# Patient Record
Sex: FEMALE | Race: BLACK OR AFRICAN AMERICAN
[De-identification: names, ages, dates, MRNs, and addresses within clinical notes are randomized per-mention and may not be internally consistent; named-entity substitution may affect disease eponyms.]

---

## 2017-12-07 ENCOUNTER — HOSPITAL ENCOUNTER (EMERGENCY)
Dept: HOSPITAL 74 - JERFT | Age: 57
Discharge: HOME | End: 2017-12-07
Payer: SELF-PAY

## 2017-12-07 VITALS — DIASTOLIC BLOOD PRESSURE: 81 MMHG | TEMPERATURE: 98.2 F | HEART RATE: 85 BPM | SYSTOLIC BLOOD PRESSURE: 125 MMHG

## 2017-12-07 VITALS — BODY MASS INDEX: 26.6 KG/M2

## 2017-12-07 DIAGNOSIS — Z91.14: ICD-10-CM

## 2017-12-07 DIAGNOSIS — L29.8: Primary | ICD-10-CM

## 2017-12-07 DIAGNOSIS — K74.60: ICD-10-CM

## 2017-12-07 DIAGNOSIS — F17.210: ICD-10-CM

## 2017-12-07 DIAGNOSIS — J45.909: ICD-10-CM

## 2017-12-07 DIAGNOSIS — Z98.84: ICD-10-CM

## 2017-12-07 NOTE — PDOC
History of Present Illness





- General


Chief Complaint: Bite


Stated Complaint: RASH ON LEGS


Time Seen by Provider: 12/07/17 13:08


History Source: Patient


Exam Limitations: No Limitations





- History of Present Illness


Initial Comments: 





12/07/17 13:30


Patient is a [56 y/o female on methadone, risperadol and lexapro.  Reports that 

she has not taken the medication in two days.  Was sitting on her bed today and 

started to have generalized itching.  " Wants to be checked for spores"  Denies 

rash.  Denies any chest pain or shortness of breath, denies feeling of anxiety 

of hurting herself or others.   ]





Past Medical History: [Denies].  





Allergies: No known allergies





Medications: [Methadone, Risperdal and Lexapro]





Family History: Non-contributory





Social History: Denies smoking, alcohol use, or IVDU





Review of Systems


GENERAL/CONSTITUTIONAL: [No fever or chills. No weakness. No weight change.]


HEAD, EYES, EARS, NOSE AND THROAT: [No change in vision. No ear pain or 

discharge. No sore throat. Eyes are itchy]


CARDIOVASCULAR: [No chest pain or shortness of breath.]


RESPIRATORY: [No cough, wheezing, or hemoptysis.]


GASTROINTESTINAL: [No nausea, vomiting, diarrhea or constipation. No rectal 

bleeding.]


GENITOURINARY: [No dysuria, frequency, or change in urination.]


MUSCULOSKELETAL: [No joint or muscle swelling or pain. No neck or back pain.]


SKIN : [No rash or easy bruising. Pruritus to upper extremities]


NEUROLOGIC: [No headache, vertigo, loss of consciousness, or loss of sensation.]


PSYCHIATRIC: [No depression or anxiety.]


ENDOCRINE: [No increased thirst. No abnormal weight change.]


HEMATOLOGIC/LYMPHATIC: [No anemia, easy bleeding, or history of blood clots.]


ALLERGIC/IMMUNOLOGIC: [No hives or skin allergy. No latex allergy.]





Physical Exam: 


GENERAL: [The patient is awake, alert, and fully oriented, in no acute distress.

]


HEAD: [Normal with no signs of trauma.]


EYES: [Pupils equal, round and reactive to light, extraocular movements intact, 

sclera anicteric, conjunctiva clear, no foreign body or discoloration. ]


ENT: [Ears normal, nares patent, oropharynx clear without exudates.  Moist 

mucous membranes. No uvula deviation]


NECK: [Normal range of motion, supple without lymphadenopathy, JVD, or masses.]


LUNGS: [Breath sounds equal, clear to auscultation bilaterally.  No wheezes, 

and no crackles.]


HEART: [Regular rate and rhythm, normal S1 and S2 without murmur, rub or gallop.

]


ABDOMEN: [Soft, nontender, normoactive bowel sounds.  No guarding, no rebound.  

No masses. No bruising or abrasions]


RECTAL : [Guaiac negative, normal rectal tone.]


MUSCULOSKELETAL: [Normal range of motion, no edema.  No clubbing or cyanosis. 

No cords, erythema, or tenderness. No CVA Tenderness with fist.]


NEUROLOGICAL: [Cranial nerves II through XII grossly intact.  Normal speech, 

normal gait.]


PSYCH: [Normal mood, flat affect.]


SKIN: [Warm, Dry, normal turgor, no rashes or lesions noted.]


12/07/17 13:58





12/07/17 16:54








Past History





- Past Medical History


Allergies/Adverse Reactions: 


 Allergies











Allergy/AdvReac Type Severity Reaction Status Date / Time


 


cats and hayfever Allergy Intermediate Itching Uncoded 12/07/17 12:13











Home Medications: 


Ambulatory Orders





Methadone HCl [Methadone Intensol] 130 mg PO DAILY 09/29/14 


Diphenhydramine HCl [Benadryl -] 25 mg PO Q8H #15 capsule 12/07/17 


Escitalopram Oxalate [Lexapro -] 5 mg PO DAILY 12/07/17 


Risperidone [Risperdal -] 0.5 mg PO ASDIR 12/07/17 








Asthma: Yes


COPD: No


Liver Disease: Yes (cirrhosis)





- Surgical History


GI Surgery: Yes (GASTRIC BYPASS)





- Immunization History


Immunization Up to Date: Yes





- Suicide/Smoking/Psychosocial Hx


Smoking History: Current every day smoker


Have you smoked in the past 12 months: Yes


Number of Cigarettes Smoked Daily: 10


Information on smoking cessation initiated: No


Hx Alcohol Use: No


Drug/Substance Use Hx: No


Substance Use Type: Alcohol, Cocaine, Heroin


Hx Substance Use Treatment: Yes (in [facility])





*Physical Exam





- Vital Signs


 Last Vital Signs











Temp Pulse Resp BP Pulse Ox


 


 98.2 F   85   16   125/81   98 


 


 12/07/17 12:14  12/07/17 12:14  12/07/17 12:14  12/07/17 12:14  12/07/17 12:14














Medical Decision Making





- Medical Decision Making





12/07/17 14:02


A/P: Patient here for evaluation of itchiness to upper extremities and itchy 

eyes, has not taken her Risperdal or her Lexapro in 2 days which may be 

attributing to her symptoms. Patient denies any abdominal pain, no chest pain 

or shortness of breath. Benadryl 25 mg by mouth ordered will reevaluate


12/07/17 16:54


Patient states that itching is resolved after the Benadryl will follow-up with 

her primary care doctor tomorrow. There is no visible rash, eyes are clear.  


I discussed the physical exam findings, ancillary test results and final 

diagnoses with the patient. I answered all of the patient's questions.


The patient was satisfied with the care received and felt comfortable with the 

discharge plan and treatment plan. 


The patient will call to arrange follow-up and will return to the Emergency 

Department with any new, persistent or worsening symptoms. 





*DC/Admit/Observation/Transfer


Diagnosis at time of Disposition: 


 Itching








- Discharge Dispostion


Disposition: HOME


Condition at time of disposition: Good


Admit: No





- Prescriptions


Prescriptions: 


Diphenhydramine HCl [Benadryl -] 25 mg PO Q8H #15 capsule





- Referrals


Referrals: 


Meg Stokes MD [Primary Care Provider] - 





- Patient Instructions


Printed Discharge Instructions:  DI for Itching


Additional Instructions: 


PLease take benadryl as needed, one tablet every 8 hours.  Please take your 

medication as prescribed.


Follow up with PMD.  





- Post Discharge Activity

## 2018-05-09 ENCOUNTER — HOSPITAL ENCOUNTER (EMERGENCY)
Dept: HOSPITAL 74 - JER | Age: 58
Discharge: HOME | End: 2018-05-09
Payer: SELF-PAY

## 2018-05-09 VITALS — HEART RATE: 67 BPM | SYSTOLIC BLOOD PRESSURE: 106 MMHG | DIASTOLIC BLOOD PRESSURE: 64 MMHG | TEMPERATURE: 98.6 F

## 2018-05-09 VITALS — BODY MASS INDEX: 26.6 KG/M2

## 2018-05-09 DIAGNOSIS — K59.00: Primary | ICD-10-CM

## 2018-05-09 DIAGNOSIS — Z98.84: ICD-10-CM

## 2018-05-09 DIAGNOSIS — F11.20: ICD-10-CM

## 2018-05-09 DIAGNOSIS — F17.210: ICD-10-CM

## 2018-05-09 LAB
ALBUMIN SERPL-MCNC: 3.8 G/DL (ref 3.4–5)
ALP SERPL-CCNC: 137 U/L (ref 45–117)
ALT SERPL-CCNC: 20 U/L (ref 12–78)
ANION GAP SERPL CALC-SCNC: 7 MMOL/L (ref 8–16)
AST SERPL-CCNC: 23 U/L (ref 15–37)
BASOPHILS # BLD: 0.5 % (ref 0–2)
BILIRUB SERPL-MCNC: 0.4 MG/DL (ref 0.2–1)
BUN SERPL-MCNC: 24 MG/DL (ref 7–18)
CALCIUM SERPL-MCNC: 8.6 MG/DL (ref 8.5–10.1)
CHLORIDE SERPL-SCNC: 108 MMOL/L (ref 98–107)
CO2 SERPL-SCNC: 24 MMOL/L (ref 21–32)
CREAT SERPL-MCNC: 0.8 MG/DL (ref 0.55–1.02)
DEPRECATED RDW RBC AUTO: 16.1 % (ref 11.6–15.6)
EOSINOPHIL # BLD: 4.9 % (ref 0–4.5)
GLUCOSE SERPL-MCNC: 58 MG/DL (ref 74–106)
HCT VFR BLD CALC: 35.4 % (ref 32.4–45.2)
HGB BLD-MCNC: 11.7 GM/DL (ref 10.7–15.3)
INR BLD: 0.95 (ref 0.82–1.09)
LIPASE SERPL-CCNC: 190 U/L (ref 73–393)
LYMPHOCYTES # BLD: 50.4 % (ref 8–40)
MCH RBC QN AUTO: 30.5 PG (ref 25.7–33.7)
MCHC RBC AUTO-ENTMCNC: 33 G/DL (ref 32–36)
MCV RBC: 92.5 FL (ref 80–96)
MONOCYTES # BLD AUTO: 7.5 % (ref 3.8–10.2)
NEUTROPHILS # BLD: 36.7 % (ref 42.8–82.8)
PLATELET # BLD AUTO: 153 K/MM3 (ref 134–434)
PMV BLD: 9.4 FL (ref 7.5–11.1)
POTASSIUM SERPLBLD-SCNC: 4.9 MMOL/L (ref 3.5–5.1)
PROT SERPL-MCNC: 7.5 G/DL (ref 6.4–8.2)
PT PNL PPP: 10.7 SEC (ref 9.7–13)
RBC # BLD AUTO: 3.83 M/MM3 (ref 3.6–5.2)
SODIUM SERPL-SCNC: 139 MMOL/L (ref 136–145)
WBC # BLD AUTO: 3.7 K/MM3 (ref 4–10)

## 2018-05-09 NOTE — PDOC
*Physical Exam





- Vital Signs


 Last Vital Signs











Temp Pulse Resp BP Pulse Ox


 


 98.6 F   67   19   106/64   96 


 


 05/09/18 13:59  05/09/18 13:59  05/09/18 13:59  05/09/18 13:59  05/09/18 13:59














ED Treatment Course





- LABORATORY


CBC & Chemistry Diagram: 


 05/09/18 17:12





 05/09/18 18:43





- ADDITIONAL ORDERS


Additional order review: 


 Laboratory  Results











  05/09/18 05/09/18 05/09/18





  18:43 17:12 17:12


 


PT with INR   


 


INR   


 


Sodium  139   Cancelled


 


Potassium  4.9   Cancelled


 


Chloride  108 H   Cancelled


 


Carbon Dioxide  24   Cancelled


 


Anion Gap  7 L   Cancelled


 


BUN  24 H   Cancelled


 


Creatinine  0.8   Cancelled


 


Creat Clearance w eGFR  > 60   Cancelled


 


Random Glucose  58 L   Cancelled


 


Calcium  8.6   Cancelled


 


Total Bilirubin  0.4   Cancelled


 


AST  23   Cancelled


 


ALT  20   Cancelled


 


Alkaline Phosphatase  137 H   Cancelled


 


Total Protein  7.5   Cancelled


 


Albumin  3.8   Cancelled


 


Lipase  190   Cancelled


 


Blood Type   A POSITIVE 


 


Antibody Screen   Negative 














  05/09/18





  17:12


 


PT with INR  10.70


 


INR  0.95


 


Sodium 


 


Potassium 


 


Chloride 


 


Carbon Dioxide 


 


Anion Gap 


 


BUN 


 


Creatinine 


 


Creat Clearance w eGFR 


 


Random Glucose 


 


Calcium 


 


Total Bilirubin 


 


AST 


 


ALT 


 


Alkaline Phosphatase 


 


Total Protein 


 


Albumin 


 


Lipase 


 


Blood Type 


 


Antibody Screen 








 











  05/09/18





  17:12


 


RBC  3.83


 


MCV  92.5


 


MCHC  33.0


 


RDW  16.1 H D


 


MPV  9.4


 


Neutrophils %  36.7 L


 


Lymphocytes %  50.4 H


 


Monocytes %  7.5


 


Eosinophils %  4.9 H


 


Basophils %  0.5














- Medications


Given in the ED: 


ED Medications














Discontinued Medications














Generic Name Dose Route Start Last Admin





  Trade Name Freq  PRN Reason Stop Dose Admin


 


Ondansetron HCl  4 mg  05/09/18 14:35  05/09/18 14:45





  Zofran Odt -  SL  05/09/18 14:36  4 mg





  ONCE ONE   Administration














Medical Decision Making





- Medical Decision Making


Pt endorsed to me by Dr. Nix. Will follow up CT A/P results. Will send for 

stool culture, O+P and c diff toxin+Ag


05/09/18 20:31





CT A/P suggestive of constipation. No evidence of ileus, SBO, fecal impaction, 

abscess, diverticulitis or other active infection. Pt tolerating PO intake, VSS

, pain improved. Will discharge with miralax rx and outpt f/u with Dr. Hugo 

for further GI care. 








*DC/Admit/Observation/Transfer


Diagnosis at time of Disposition: 


 Constipation








- Discharge Dispostion


Disposition: HOME


Decision to Admit order: No





- Prescriptions


Prescriptions: 


Polyethylene Glycol 3350 [Miralax (For Bowel Prep) -] 17 gm PO DAILY #1 bottle





- Referrals


Referrals: 


Meg Stokes MD [Primary Care Provider] - 1 week


Jeffy Hugo MD [Staff Physician] - 1 week





- Patient Instructions


Printed Discharge Instructions:  DI for Constipation


Additional Instructions: 


You were evaluated for abdominal pain in ED and received CT of your abdomen to 

rule out any serious infectious or serious GI processes. Your CT scan was 

notable for significant stool in your colon suggestive of constipation. You 

labs and vitals have all been normal. We are discharge you with medication for 

constipation (Miralax). Please take it as needed. We are also providing a 

referral to seen our GI specialist, Dr Hugo for further evaluation and 

management of your GI care. 





If you experience any of the following symptoms, please return to the hospital:


- Worsening abdominal pain


- Persistent, copious diarrhea


- blood in your stool or dark bowel movements


- persistent fevers or chills





- Post Discharge Activity

## 2018-05-09 NOTE — PDOC
History of Present Illness





- General


Chief Complaint: Pain, Acute


Stated Complaint: ABD PAIN, NAUSEA


Time Seen by Provider: 05/09/18 14:33


History Source: Patient


Exam Limitations: No Limitations





- History of Present Illness


Initial Comments: 





05/09/18 16:34


The patient is a 58F with a PMH of gastric bypass (30 years ago) who presents 

to the ER with abdominal pain. The patient states that her abdominal pain 

started on Sunday and is described as crampy upper abdominal pain associated 

with foul smelling diarrhea. She denies any recent antibiotic use and denies 

any colonoscopy in the past. The patient states that she gets "intestinal 

infections" every "once in a while" and takes antibiotics and feels better. The 

last time this happened to her it was 2 years ago. She denies any fever, chills

, nausea, vomiting, CP, SOB, vaginal bleeding/discharge, hematuria, 

hematochezia.





Past History





- Past Medical History


Allergies/Adverse Reactions: 


 Allergies











Allergy/AdvReac Type Severity Reaction Status Date / Time


 


No Known Allergies Allergy   Verified 05/09/18 13:59











Home Medications: 


Ambulatory Orders





Methadone [Dolophine -] 110 mg PO DAILY 05/09/18 


Polyethylene Glycol 3350 [Miralax (For Bowel Prep) -] 17 gm PO DAILY #1 bottle 

05/09/18 








Anemia: No


Asthma: No


Cancer: No


Cardiac Disorders: No


CVA: No


COPD: No


CHF: No


Dementia: No


Diabetes: No


GI Disorders: Yes (COLITIS)


 Disorders: No


HTN: No


Hypercholesterolemia: No


Kidney Stones: No


Liver Disease: No


Seizures: No


Thyroid Disease: No





- Surgical History


Abdominal Surgery: No


Cardiac Surgery: No


Cholecystectomy: No


Lung Surgery: No


Neurologic Surgery: No


Orthopedic Surgery: No





- Suicide/Smoking/Psychosocial Hx


Smoking History: Current some day smoker


Have you smoked in the past 12 months: Yes


Number of Cigarettes Smoked Daily: 20


Cigars Per Day: 0


Information on smoking cessation initiated: No


Hx Alcohol Use: No


Drug/Substance Use Hx: Yes (crack $70.m/week  , marijuana 1 bag /week ; last 

use 1 day ago)


Substance Use Type: Cocaine, Marijuana


Hx Substance Use Treatment: No





**Review of Systems





- Review of Systems


Able to Perform ROS?: Yes


Comments:: 





05/09/18 16:48


GENERAL/CONSTITUTIONAL: No fever or chills. No weakness.


HEAD, EYES, EARS, NOSE AND THROAT: No change in vision. No ear pain or 

discharge. No sore throat.


CARDIOVASCULAR: No chest pain, palpitations, or lightheadedness.


RESPIRATORY: No cough, wheezing, shortness of breath, or hemoptysis.


GASTROINTESTINAL: Positive for abdominal pain and foul smelling BM's. No nausea

, vomiting, diarrhea, or constipation. 


GENITOURINARY: No dysuria, frequency, hematuria, or change in urination.


MUSCULOSKELETAL: No joint or muscle swelling or pain. No neck or back pain.


SKIN: No rash or lesions. 


NEUROLOGIC: No headache, numbness, tingling, weakness, loss of consciousness, 

or change in strength/sensation.


ENDOCRINE: No increased thirst. No abnormal weight change.


HEMATOLOGIC/LYMPHATIC: No anemia, easy bleeding, or history of blood clots.


ALLERGIC/IMMUNOLOGIC: No hives or skin allergy.





Is the patient limited English proficient: No





*Physical Exam





- Vital Signs


 Last Vital Signs











Temp Pulse Resp BP Pulse Ox


 


 98.6 F   67   19   106/64   96 


 


 05/09/18 13:59  05/09/18 13:59  05/09/18 13:59  05/09/18 13:59  05/09/18 13:59














- Physical Exam


Comments: 





05/09/18 16:49


GENERAL: Well developed, well nourished. Awake and alert. No acute distress.


HEENT: Normocephalic, atraumatic. Hearing grossly normal. Moist mucous 

membranes. PERRLA, EOMI. No conjunctival pallor. Sclera are non-icteric. 


NECK: Supple. Full ROM. No JVD. 


CARDIOVASCULAR: Regular rate and rhythm. No murmurs, rubs, or gallops.


PULMONARY: No evidence of respiratory distress. Lungs clear to auscultation 

bilaterally. No wheezing, rales or rhonchi.


ABDOMINAL: Soft. Tender to deep palpation over upper abdomen. Non-distended. No 

rebound or guarding. 


GENITOURINARY: No CVA tenderness bilaterally.


MUSCULOSKELETAL: Normal range of motion at all joints. No bony deformities or 

tenderness. 


EXTREMITIES: No cyanosis. No clubbing. No edema. No calf tenderness.


SKIN: Warm and dry. Normal capillary refill. No rashes. No jaundice. 


NEUROLOGICAL: Alert, awake, appropriate. Cranial nerves 2-12 intact. Normal 

speech. Gait is normal without ataxia.


PSYCHIATRIC: Cooperative. Good eye contact. Appropriate mood and affect.








ED Treatment Course





- LABORATORY


CBC & Chemistry Diagram: 


 05/09/18 17:12





 05/09/18 18:43





- RADIOLOGY


Radiology Studies Ordered: 














 Category Date Time Status


 


 ABDOMEN & PELVIS CT WITH CONTR [CT] Stat CT Scan  05/09/18 15:02 Ordered














- Medications


Given in the ED: 


ED Medications














Discontinued Medications














Generic Name Dose Route Start Last Admin





  Trade Name Freq  PRN Reason Stop Dose Admin


 


Ondansetron HCl  4 mg  05/09/18 14:35  05/09/18 14:45





  Zofran Odt -  SL  05/09/18 14:36  4 mg





  ONCE ONE   Administration














Medical Decision Making





- Medical Decision Making





05/09/18 16:50


The patient is a 58F with a PMH of gastric bypass is presenting with crampy 

abdominal pain and foul smelling BM's. Will order labs and imaging and reassess 

patient.





05/09/18 20:33


Pending CT based on Cr. Pt signed out to Dr. Avila, night team.








*DC/Admit/Observation/Transfer


Diagnosis at time of Disposition: 


 Constipation








- Discharge Dispostion


Disposition: HOME


Condition at time of disposition: Stable





- Prescriptions


Prescriptions: 


Polyethylene Glycol 3350 [Miralax (For Bowel Prep) -] 17 gm PO DAILY #1 bottle





- Referrals


Referrals: 


Jeffy Hugo MD [Staff Physician] - 1 week


Meg Stokes MD [Primary Care Provider] - 1 week





- Patient Instructions


Printed Discharge Instructions:  DI for Constipation


Additional Instructions: 


You were evaluated for abdominal pain in ED and received CT of your abdomen to 

rule out any serious infectious or serious GI processes. Your CT scan was 

notable for significant stool in your colon suggestive of constipation. You 

labs and vitals have all been normal. We are discharge you with medication for 

constipation (Miralax). Please take it as needed. We are also providing a 

referral to seen our GI specialist, Dr Hugo for further evaluation and 

management of your GI care. 





If you experience any of the following symptoms, please return to the hospital:


- Worsening abdominal pain


- Persistent, copious diarrhea


- blood in your stool or dark bowel movements


- persistent fevers or chills





- Post Discharge Activity

## 2018-05-09 NOTE — PDOC
Attending Attestation





- HPI


HPI: 





05/09/18 16:49


The patient is a 58 year old female, with a significant past medical history of 

gastric bypass and multiple intestinal infections, who presents to the 

emergency department with crampy abdominal pain for approximately 4 days. The 

patient reports her pain is localized to the left upper quadrant. She reports 

associated constipation 4 days ago, and multiple episodes of malodorous 

nonbloody diarrhea 3 days ago. She reports nausea and nonbloody/nonbilious 

vomiting, but denies any changes in appetite.Patient reports her symptoms are 

similar to when she's had an intestinal infection in the past. She denies any 

fever or chills. She denies any dysuria, hematuria, frequency, or urgency. She 

denies any recent travel or sick contacts.








- Physicial Exam


PE: 





05/09/18 17:01


CONSTITUTIONAL: Well-appearing; well-nourished; in no apparent distress


HEAD: Normocephalic; atraumatic


EYES: PERRL; EOM intact


ENMT: External appears normal; normal oropharynx


NECK: Supple; non-tender; no cervical lymphadenopathy. 


CARD: Normal S1, S2; no murmurs, rubs, or gallops


RESP: Normal chest excursion with respiration; breath sounds clear and equal 

bilaterally; no wheezes, rhonchi, or rales


ABD: Surgical scar. Mild LUQ tenderness, soft, but distended. Decreased bowel 

sounds. No palpable organomegaly, no palpable hernias


EXT: Normal ROM in all four extremities; non-tender to palpation; distal pulses 

intact


SKIN: Warm, dry, no rash


NEURO: No focal neurological deficiencies.








- Medical Decision Making





05/09/18 16:52





Documentation prepared by Yumiko Zheng, acting as medical scribe for Andrea Bertrand MD.





<Yumiko Zheng - Last Filed: 05/09/18 17:05>





- Resident


Resident Name: Severiano Nix





- ED Attending Attestation


I have performed the following: I have examined & evaluated the patient, The 

case was reviewed & discussed with the resident, I agree w/resident's findings 

& plan, Exceptions are as noted





- Medical Decision Making








05/09/18 17:06


Patient is a 58-year-old female status post gastric bypass who presents to the 

ER with abdominal distention, nausea vomiting and left-sided abdominal pain for 

the past several days associated with constipation early that is not 

transformed into diarrhea. On the exam, patient is minimal left-sided abdominal 

discomfort, abdomen is soft, distended and tympanitic. Differential diagnoses 

includes colitis versus acute gastroenteritis versus ileus versus SBO. Will 

obtain CBC/CMP/UA. Will obtain CT that and pelvis with by mouth and IV 

contrast. Will reassess.





<Andrea Bertrand - Last Filed: 05/09/18 17:07>

## 2019-06-15 ENCOUNTER — HOSPITAL ENCOUNTER (EMERGENCY)
Dept: HOSPITAL 74 - JERFT | Age: 59
Discharge: HOME | End: 2019-06-15
Payer: COMMERCIAL

## 2019-10-11 ENCOUNTER — HOSPITAL ENCOUNTER (EMERGENCY)
Dept: HOSPITAL 74 - JER | Age: 59
LOS: 1 days | Discharge: HOME | End: 2019-10-12
Payer: COMMERCIAL

## 2019-10-11 VITALS — BODY MASS INDEX: 24.7 KG/M2

## 2019-10-11 VITALS — SYSTOLIC BLOOD PRESSURE: 105 MMHG | HEART RATE: 89 BPM | TEMPERATURE: 98.2 F | DIASTOLIC BLOOD PRESSURE: 72 MMHG

## 2019-10-11 DIAGNOSIS — Z87.19: ICD-10-CM

## 2019-10-11 DIAGNOSIS — M25.562: ICD-10-CM

## 2019-10-11 DIAGNOSIS — Z91.048: ICD-10-CM

## 2019-10-11 DIAGNOSIS — Z98.84: ICD-10-CM

## 2019-10-11 DIAGNOSIS — M25.561: ICD-10-CM

## 2019-10-11 DIAGNOSIS — G89.29: ICD-10-CM

## 2019-10-11 DIAGNOSIS — R10.84: Primary | ICD-10-CM

## 2019-10-11 DIAGNOSIS — R14.1: ICD-10-CM

## 2019-10-11 DIAGNOSIS — J30.81: ICD-10-CM

## 2019-10-11 PROCEDURE — 3E0F7GC INTRODUCTION OF OTHER THERAPEUTIC SUBSTANCE INTO RESPIRATORY TRACT, VIA NATURAL OR ARTIFICIAL OPENING: ICD-10-PCS

## 2019-10-11 PROCEDURE — 3E033GC INTRODUCTION OF OTHER THERAPEUTIC SUBSTANCE INTO PERIPHERAL VEIN, PERCUTANEOUS APPROACH: ICD-10-PCS

## 2019-10-12 LAB
ALBUMIN SERPL-MCNC: 4.1 G/DL (ref 3.4–5)
ALP SERPL-CCNC: 137 U/L (ref 45–117)
ALT SERPL-CCNC: 28 U/L (ref 13–61)
ANION GAP SERPL CALC-SCNC: 7 MMOL/L (ref 8–16)
AST SERPL-CCNC: 21 U/L (ref 15–37)
BASOPHILS # BLD: 0.5 % (ref 0–2)
BILIRUB SERPL-MCNC: 0.6 MG/DL (ref 0.2–1)
BUN SERPL-MCNC: 9.5 MG/DL (ref 7–18)
CALCIUM SERPL-MCNC: 9.1 MG/DL (ref 8.5–10.1)
CHLORIDE SERPL-SCNC: 106 MMOL/L (ref 98–107)
CO2 SERPL-SCNC: 26 MMOL/L (ref 21–32)
CREAT SERPL-MCNC: 0.9 MG/DL (ref 0.55–1.3)
DEPRECATED RDW RBC AUTO: 15 % (ref 11.6–15.6)
EOSINOPHIL # BLD: 2 % (ref 0–4.5)
GLUCOSE SERPL-MCNC: 90 MG/DL (ref 74–106)
HCT VFR BLD CALC: 36.8 % (ref 32.4–45.2)
HGB BLD-MCNC: 12 GM/DL (ref 10.7–15.3)
INR BLD: 0.97 (ref 0.83–1.09)
LIPASE SERPL-CCNC: 105 U/L (ref 73–393)
LYMPHOCYTES # BLD: 35.8 % (ref 8–40)
MCH RBC QN AUTO: 30.2 PG (ref 25.7–33.7)
MCHC RBC AUTO-ENTMCNC: 32.7 G/DL (ref 32–36)
MCV RBC: 92.4 FL (ref 80–96)
MONOCYTES # BLD AUTO: 9.1 % (ref 3.8–10.2)
NEUTROPHILS # BLD: 52.6 % (ref 42.8–82.8)
PLATELET # BLD AUTO: 154 K/MM3 (ref 134–434)
PMV BLD: 8.7 FL (ref 7.5–11.1)
POTASSIUM SERPLBLD-SCNC: 3.6 MMOL/L (ref 3.5–5.1)
PROT SERPL-MCNC: 7.6 G/DL (ref 6.4–8.2)
PT PNL PPP: 11.5 SEC (ref 9.7–13)
RBC # BLD AUTO: 3.98 M/MM3 (ref 3.6–5.2)
SODIUM SERPL-SCNC: 139 MMOL/L (ref 136–145)
WBC # BLD AUTO: 4.9 K/MM3 (ref 4–10)

## 2019-10-12 NOTE — PDOC
Attending Attestation





- Resident


Resident Name: Mark Tinsley





- ED Attending Attestation


I have performed the following: I have examined & evaluated the patient, The 

case was reviewed & discussed with the resident, I agree w/resident's findings 

& plan, Exceptions are as noted





- HPI


HPI: 





10/12/19 00:59


Ms Resendez is a 58 yo F with a h/o gastric bypass and multiple intestinal 

infections, who presents to the emergency department with left sided, crampy 

abdominal pain for approximately 4 days. The patient reports her pain is 

localized to the left upper quadrant. She reports associated constipation 4 

days ago, and multiple episodes of malodorous nonbloody diarrhea 3 days ago. 

She reports nausea and nonbloody/nonbilious vomiting, but denies any changes in 

appetite.Patient reports her symptoms are similar to when she's had an 

intestinal infection in the past. She denies any fever or chills. She denies 

any dysuria, hematuria, frequency, or urgency. She denies any recent travel or 

sick contacts.








- Physicial Exam


PE: 





10/12/19 01:00


 


CONSTITUTIONAL: Well-appearing; well-nourished; in no apparent distress


HEAD: Normocephalic; atraumatic


EYES: PERRL; EOM intact


ENMT: External appears normal; normal oropharynx


NECK: Supple; non-tender; no cervical lymphadenopathy. 


CARD: Normal S1, S2; no murmurs, rubs, or gallops


RESP: Normal chest excursion with respiration; breath sounds clear and equal 

bilaterally; no wheezes, rhonchi, or rales


ABD: Surgical scar. Mild LUQ tenderness, soft, but distended. Decreased bowel 

sounds. No palpable organomegaly, no palpable hernias


EXT: Normal ROM in all four extremities; non-tender to palpation; distal pulses 

intact


SKIN: Warm, dry, no rash


NEURO: No focal neurological deficiencies.











- Medical Decision Making





10/12/19 01:00


Patient is a 58-year-old female status post gastric bypass who presents to the 

ER with left-sided abdominal pain


PT also reports shortness of breath/wheezing








Differential diagnoses includes colitis versus acute gastroenteritis versus 

ileus versus SBO. 


Will obtain CBC/CMP/UA. 


Will obtain CT w/IV contrast 


Re assess


10/12/19 01:01


Respiratory symptoms have improved after saline neb





10/12/19 03:27


 Laboratory Tests











  10/12/19 10/12/19 10/12/19





  00:35 00:35 00:35


 


WBC   4.9 


 


Hgb   12.0 


 


Hct   36.8 


 


Plt Count   154 


 


INR  0.97  


 


BUN    9.5


 


Creatinine    0.9














CT:


EXAM: ABDOMEN \T\ PELVIS CT WITH CONTR


HISTORY: Rule out obstruction


COMPARISON: None.


FINDINGS: Lung bases are clear. The visualized cardiac chambers are normal size 

and


configuration. Status post cholecystectomy with mild biliary duct dilation. 

Normal pancreas, spleen,


adrenal glands and kidneys. Status post gastric bypass without bowel 

obstruction or inflammation.


There is diffuse colonic distention with air and liquid stool up to 6.7 cm 

which may indicate colonic


ileus and/or diarrheal illness. No colonic wall thickening, although there is 

mild right colonic


pneumatosis.. There is no aortic aneurysm. There is no significant 

retroperitoneal


lymphadenopathy.





The appendix is normal The uterus and adnexal structures are grossly normal 

though partially


obscured by streak artifact from a right hip prosthesis. Urinary bladder is 

normal but partially


obscured. There is no pelvic free fluid. No discrete pelvic lymphadenopathy is 

identified.


IMPRESSION: Colonic ileus and/or diarrheal illness without bowel obstruction or 

bowel wall


thickening.


Right colonic pneumatosis also raises the possibility of bowel ischemia 

although this is considered


less likely.


10/12/19 04:10


Pt po challenged


Tolerated this with no vomiting


Review of prior CT, demonstrates similar findings


Call placed to radiologist: what he has read as pneumatosis is not clearly that 

and he does not believe this represents ischemic bowel


Will ask pt to follow up with PMD


Return to the ER for any other concerns or complaints

## 2021-02-24 ENCOUNTER — HOSPITAL ENCOUNTER (EMERGENCY)
Dept: HOSPITAL 74 - JER | Age: 61
Discharge: HOME | End: 2021-02-24
Payer: COMMERCIAL

## 2021-02-24 VITALS — TEMPERATURE: 97.8 F

## 2021-02-24 VITALS — BODY MASS INDEX: 20.9 KG/M2

## 2021-02-24 VITALS — DIASTOLIC BLOOD PRESSURE: 69 MMHG | HEART RATE: 81 BPM | SYSTOLIC BLOOD PRESSURE: 113 MMHG

## 2021-02-24 DIAGNOSIS — M48.062: Primary | ICD-10-CM

## 2021-02-24 LAB
ALBUMIN SERPL-MCNC: 4 G/DL (ref 3.4–5)
ALP SERPL-CCNC: 108 U/L (ref 45–117)
ALT SERPL-CCNC: 28 U/L (ref 13–61)
ANION GAP SERPL CALC-SCNC: 9 MMOL/L (ref 8–16)
APPEARANCE UR: CLEAR
AST SERPL-CCNC: 20 U/L (ref 15–37)
BASOPHILS # BLD: 0.5 % (ref 0–2)
BILIRUB SERPL-MCNC: 0.5 MG/DL (ref 0.2–1)
BILIRUB UR STRIP.AUTO-MCNC: NEGATIVE MG/DL
BUN SERPL-MCNC: 14.7 MG/DL (ref 7–18)
CALCIUM SERPL-MCNC: 9.6 MG/DL (ref 8.5–10.1)
CHLORIDE SERPL-SCNC: 110 MMOL/L (ref 98–107)
CO2 SERPL-SCNC: 24 MMOL/L (ref 21–32)
COLOR UR: YELLOW
CREAT SERPL-MCNC: 0.8 MG/DL (ref 0.55–1.3)
DEPRECATED RDW RBC AUTO: 15.5 % (ref 11.6–15.6)
EOSINOPHIL # BLD: 1.5 % (ref 0–4.5)
GLUCOSE SERPL-MCNC: 86 MG/DL (ref 74–106)
HCT VFR BLD CALC: 30.8 % (ref 32.4–45.2)
HGB BLD-MCNC: 10 GM/DL (ref 10.7–15.3)
KETONES UR QL STRIP: NEGATIVE
LEUKOCYTE ESTERASE UR QL STRIP.AUTO: NEGATIVE
LYMPHOCYTES # BLD: 32.3 % (ref 8–40)
MCH RBC QN AUTO: 30.7 PG (ref 25.7–33.7)
MCHC RBC AUTO-ENTMCNC: 32.5 G/DL (ref 32–36)
MCV RBC: 94.3 FL (ref 80–96)
MONOCYTES # BLD AUTO: 5.9 % (ref 3.8–10.2)
NEUTROPHILS # BLD: 59.8 % (ref 42.8–82.8)
NITRITE UR QL STRIP: NEGATIVE
PH UR: 5.5 [PH] (ref 5–8)
PLATELET # BLD AUTO: 152 K/MM3 (ref 134–434)
PMV BLD: 9.5 FL (ref 7.5–11.1)
POTASSIUM SERPLBLD-SCNC: 3.9 MMOL/L (ref 3.5–5.1)
PROT SERPL-MCNC: 7.2 G/DL (ref 6.4–8.2)
PROT UR QL STRIP: NEGATIVE
PROT UR QL STRIP: NEGATIVE
RBC # BLD AUTO: 3.27 M/MM3 (ref 3.6–5.2)
SODIUM SERPL-SCNC: 143 MMOL/L (ref 136–145)
SP GR UR: 1.01 (ref 1.01–1.03)
UROBILINOGEN UR STRIP-MCNC: 0.2 MG/DL (ref 0.2–1)
WBC # BLD AUTO: 3.6 K/MM3 (ref 4–10)

## 2021-02-24 PROCEDURE — C9803 HOPD COVID-19 SPEC COLLECT: HCPCS

## 2021-02-24 PROCEDURE — U0003 INFECTIOUS AGENT DETECTION BY NUCLEIC ACID (DNA OR RNA); SEVERE ACUTE RESPIRATORY SYNDROME CORONAVIRUS 2 (SARS-COV-2) (CORONAVIRUS DISEASE [COVID-19]), AMPLIFIED PROBE TECHNIQUE, MAKING USE OF HIGH THROUGHPUT TECHNOLOGIES AS DESCRIBED BY CMS-2020-01-R: HCPCS

## 2021-03-03 ENCOUNTER — HOSPITAL ENCOUNTER (EMERGENCY)
Dept: HOSPITAL 74 - JER | Age: 61
Discharge: ANOTHER HOSPITAL | End: 2021-03-03
Payer: COMMERCIAL

## 2021-03-03 VITALS — TEMPERATURE: 98.1 F | DIASTOLIC BLOOD PRESSURE: 67 MMHG | SYSTOLIC BLOOD PRESSURE: 110 MMHG | HEART RATE: 81 BPM

## 2021-03-03 VITALS — BODY MASS INDEX: 21.4 KG/M2

## 2021-03-03 DIAGNOSIS — M25.551: Primary | ICD-10-CM

## 2021-03-03 LAB
ALBUMIN SERPL-MCNC: 3.4 G/DL (ref 3.4–5)
ALP SERPL-CCNC: 95 U/L (ref 45–117)
ALT SERPL-CCNC: 27 U/L (ref 13–61)
ANION GAP SERPL CALC-SCNC: 6 MMOL/L (ref 8–16)
AST SERPL-CCNC: 14 U/L (ref 15–37)
BASOPHILS # BLD: 0.4 % (ref 0–2)
BILIRUB SERPL-MCNC: 0.4 MG/DL (ref 0.2–1)
BUN SERPL-MCNC: 15.1 MG/DL (ref 7–18)
CALCIUM SERPL-MCNC: 8.6 MG/DL (ref 8.5–10.1)
CHLORIDE SERPL-SCNC: 112 MMOL/L (ref 98–107)
CO2 SERPL-SCNC: 26 MMOL/L (ref 21–32)
CREAT SERPL-MCNC: 0.7 MG/DL (ref 0.55–1.3)
DEPRECATED RDW RBC AUTO: 15.1 % (ref 11.6–15.6)
EOSINOPHIL # BLD: 1.2 % (ref 0–4.5)
GLUCOSE SERPL-MCNC: 79 MG/DL (ref 74–106)
HCT VFR BLD CALC: 29.6 % (ref 32.4–45.2)
HGB BLD-MCNC: 9.6 GM/DL (ref 10.7–15.3)
LYMPHOCYTES # BLD: 40.7 % (ref 8–40)
MCH RBC QN AUTO: 30.1 PG (ref 25.7–33.7)
MCHC RBC AUTO-ENTMCNC: 32.4 G/DL (ref 32–36)
MCV RBC: 93 FL (ref 80–96)
MONOCYTES # BLD AUTO: 7.6 % (ref 3.8–10.2)
NEUTROPHILS # BLD: 50.1 % (ref 42.8–82.8)
PLATELET # BLD AUTO: 149 K/MM3 (ref 134–434)
PMV BLD: 8.6 FL (ref 7.5–11.1)
POTASSIUM SERPLBLD-SCNC: 3.5 MMOL/L (ref 3.5–5.1)
PROT SERPL-MCNC: 6.4 G/DL (ref 6.4–8.2)
RBC # BLD AUTO: 3.19 M/MM3 (ref 3.6–5.2)
SODIUM SERPL-SCNC: 144 MMOL/L (ref 136–145)
WBC # BLD AUTO: 3.8 K/MM3 (ref 4–10)

## 2021-09-22 NOTE — PDOC
History of Present Illness





- General


Chief Complaint: Pain


Stated Complaint: ABD PAIN


Time Seen by Provider: 10/11/19 23:48





- History of Present Illness


Initial Comments: 


 59 year female history of gastric bypass, allergic rhinitis, on methadone (20 

daily) presents with a few chronic complaints including abdominal pain, upper 

back pain, and congestion. She states that she has had this abdominal pain for 

the past few months intermittently and states that she has been diagnosed with 

intestinal obstruction a week ago at [facility] and states that they discharged 

her despite stating that it didn't respond. Denies nausea, vomiting, fevers, 

chest pain, or other symptoms but does admit to diarrhea.


10/12/19 00:42








Past History





- Past Medical History


Allergies/Adverse Reactions: 


 Allergies











Allergy/AdvReac Type Severity Reaction Status Date / Time


 


cat dander Allergy Intermediate Itching Verified 10/12/19 03:17


 


cats and hayfever Allergy Intermediate Itching Uncoded 10/12/19 03:16











Home Medications: 


Ambulatory Orders





Methadone [Dolophine -] 50 mg PO DAILY 05/09/18 


Simethicone Liquid [Mylicon Liquid -] 40 mg PO QID PRN #1 bottle 10/12/19 








Anemia: No


Asthma: No


Cancer: No


Cardiac Disorders: No


CVA: No


COPD: No


CHF: No


Dementia: No


Diabetes: No


GI Disorders: Yes (COLITIS)


 Disorders: No


HTN: No


Hypercholesterolemia: No


Kidney Stones: No


Liver Disease: No


Seizures: No


Thyroid Disease: No





- Surgical History


Abdominal Surgery: No


Cardiac Surgery: No


Cholecystectomy: No


GI Surgery: Yes (GASTRIC BYPASS)


Lung Surgery: No


Neurologic Surgery: No


Orthopedic Surgery: No





- Immunization History


Immunization Up to Date: Yes





- Psycho Social/Smoking Cessation Hx


Smoking History: Never smoked


Have you smoked in the past 12 months: Yes


Number of Cigarettes Smoked Daily: 2


Cigars Per Day: 0


Information on smoking cessation initiated: No


Hx Alcohol Use: No


Drug/Substance Use Hx: No


Substance Use Type: Cocaine, Marijuana


Hx Substance Use Treatment: No





**Review of Systems





- Review of Systems


Constitutional: No: Chills, Diaphoresis, Fever


HEENTM: No: Blurred Vision, Tearing, Recent change in vision


Respiratory: No: Cough, Orthopnea, Shortness of Breath


Cardiac (ROS): No: Chest Pain, Irregular Heart Rate


ABD/GI: Yes: Diarrhea.  No: Nausea, Vomiting


: No: Dysuria, Discharge, Frequency


Musculoskeletal: Yes: Back Pain.  No: Joint Pain, Joint Swelling


Neurological: No: Headache, Numbness, Paresthesia


Psychiatric: No: Anxiety, Depression


Endocrine: No: Flushing, Intolerance to Cold


Hematologic/Lymphatic: No: Anemia, Blood Clots, Easy Bleeding





*Physical Exam





- Vital Signs


 Last Vital Signs











Temp Pulse Resp BP Pulse Ox


 


 98.2 F   89   17   105/72   100 


 


 10/11/19 21:33  10/11/19 21:33  10/11/19 21:33  10/11/19 21:33  10/11/19 21:33














- Physical Exam


General Appearance: Yes: Nourished, Appropriately Dressed.  No: Apparent 

Distress


HEENT: positive: EOMI, KEEGAN, Normal ENT Inspection, Normal Voice, Nasal 

Congestion


Neck: positive: Trachea midline, Normal Thyroid, Supple, Lymphadenopathy (R).  

negative: Tender, Rigid


Respiratory/Chest: positive: Lungs Clear, Normal Breath Sounds.  negative: 

Chest Tender, Respiratory Distress


Cardiovascular: positive: Regular Rhythm, Regular Rate


Gastrointestinal/Abdominal: positive: Soft, Protuberent.  negative: Normal 

Bowel Sounds (hyperactive bs), Tender, Flat, Guarding, Rebound, Tenderness, 

Hernia, Mass


Lymphatic: negative: Adenopathy, Tenderness


Musculoskeletal: positive: Normal Inspection.  negative: Decreased Range of 

Motion


Extremity: positive: Normal Capillary Refill, Normal Inspection, Normal Range 

of Motion.  negative: Tender


Integumentary: positive: Normal Color, Dry, Warm


Neurologic: positive: Fully Oriented, Alert, Normal Mood/Affect, Normal Response

, Motor Strength 5/5





ED Treatment Course





- LABORATORY


CBC & Chemistry Diagram: 


 10/12/19 00:35





 10/12/19 00:35





Medical Decision Making





- Medical Decision Making


59 year old female with history of bypass presenting with "bloating" and 

diarrhe afor the past few days. denies fevers, chills, nausea, vomiting, or 

other symptoms. Labs WNL and CT demonstrating gas throughout bowels and concern 

for diarrheal illness. There was also a read of pneumatosis but discussed this 

with radiology and they did not believe that it was due to ischemia and instead 

due to distension. Patient has normal lactic acid, stbale vitals, and abdominal 

exam is non peritoneal or surgical. Tolerated PO. Will DC patient with 

simethacone as she feels better. 


10/12/19 03:24











Discharge





- Discharge Information


Problems reviewed: Yes


Clinical Impression/Diagnosis: 


 Pain





Chronic knee pain


Qualifiers:


 Laterality: bilateral Qualified Code(s): M25.561 - Pain in right knee; M25.562 

- Pain in left knee; G89.29 - Other chronic pain





Abdominal pain


Qualifiers:


 Abdominal location: generalized Qualified Code(s): R10.84 - Generalized 

abdominal pain





Disposition: HOME





- Admission


No





- Additional Discharge Information


Prescriptions: 


Simethicone Liquid [Mylicon Liquid -] 40 mg PO QID PRN #1 bottle


 PRN Reason: Gas





- Follow up/Referral


Referrals: 


[facility] [Provider Group]





- Patient Discharge Instructions


Patient Printed Discharge Instructions:  Diarrhea


Additional Instructions: 


Please use the simeticone as directed for the gas. Please continue to eat and 

drink soft foods easy on your stomach. Please follow up with your doctor or use 

the doctor on this sheet. Please return to the ED if you have new or worsening 

symptoms.





- Post Discharge Activity Render Risk Assessment In Note?: no Additional Notes: Discussed Scarring on the back . When completes Accutane will start Tazarotene foam Detail Level: Simple